# Patient Record
Sex: MALE | URBAN - NONMETROPOLITAN AREA
[De-identification: names, ages, dates, MRNs, and addresses within clinical notes are randomized per-mention and may not be internally consistent; named-entity substitution may affect disease eponyms.]

---

## 2024-09-30 ENCOUNTER — OFFICE VISIT (OUTPATIENT)
Dept: INTERNAL MEDICINE | Age: 29
End: 2024-09-30
Payer: MEDICAID

## 2024-09-30 ENCOUNTER — HOSPITAL ENCOUNTER (OUTPATIENT)
Dept: GENERAL RADIOLOGY | Age: 29
Discharge: HOME | End: 2024-09-30
Payer: MEDICAID

## 2024-09-30 VITALS
BODY MASS INDEX: 34.59 KG/M2 | HEIGHT: 72 IN | WEIGHT: 255.4 LBS | SYSTOLIC BLOOD PRESSURE: 120 MMHG | OXYGEN SATURATION: 97 % | DIASTOLIC BLOOD PRESSURE: 85 MMHG | HEART RATE: 75 BPM

## 2024-09-30 DIAGNOSIS — Z00.00 ANNUAL PHYSICAL EXAM: ICD-10-CM

## 2024-09-30 DIAGNOSIS — H83.3X9 NOISE-INDUCED HEARING LOSS, UNSPECIFIED LATERALITY: ICD-10-CM

## 2024-09-30 DIAGNOSIS — R07.89 LEFT-SIDED CHEST WALL PAIN: ICD-10-CM

## 2024-09-30 DIAGNOSIS — Z00.00 ANNUAL PHYSICAL EXAM: Primary | ICD-10-CM

## 2024-09-30 DIAGNOSIS — H16.139 ACTINIC KERATITIS, UNSPECIFIED LATERALITY: Primary | ICD-10-CM

## 2024-09-30 DIAGNOSIS — J30.1 NON-SEASONAL ALLERGIC RHINITIS DUE TO POLLEN: ICD-10-CM

## 2024-09-30 PROBLEM — J30.9 ALLERGIC RHINITIS: Status: ACTIVE | Noted: 2024-09-30

## 2024-09-30 LAB
ALBUMIN SERPL-MCNC: 4.6 G/DL (ref 3.5–5.2)
ALP SERPL-CCNC: 107 U/L (ref 40–129)
ALT SERPL-CCNC: 15 U/L (ref 5–41)
ANION GAP SERPL CALCULATED.3IONS-SCNC: 10 MMOL/L (ref 7–19)
AST SERPL-CCNC: 16 U/L (ref 5–40)
BASOPHILS # BLD: 0.1 K/UL (ref 0–0.2)
BASOPHILS NFR BLD: 0.9 % (ref 0–1)
BILIRUB SERPL-MCNC: 0.5 MG/DL (ref 0.2–1.2)
BUN SERPL-MCNC: 6 MG/DL (ref 6–20)
CALCIUM SERPL-MCNC: 8.9 MG/DL (ref 8.6–10)
CHLORIDE SERPL-SCNC: 101 MMOL/L (ref 98–111)
CHOLEST SERPL-MCNC: 186 MG/DL (ref 0–199)
CO2 SERPL-SCNC: 28 MMOL/L (ref 22–29)
CREAT SERPL-MCNC: 0.8 MG/DL (ref 0.7–1.2)
EOSINOPHIL # BLD: 0.5 K/UL (ref 0–0.6)
EOSINOPHIL NFR BLD: 5.3 % (ref 0–5)
ERYTHROCYTE [DISTWIDTH] IN BLOOD BY AUTOMATED COUNT: 13.5 % (ref 11.5–14.5)
GLUCOSE SERPL-MCNC: 87 MG/DL (ref 70–99)
HCT VFR BLD AUTO: 47.2 % (ref 42–52)
HDLC SERPL-MCNC: 42 MG/DL (ref 40–60)
HGB BLD-MCNC: 14.9 G/DL (ref 14–18)
IMM GRANULOCYTES # BLD: 0 K/UL
LDLC SERPL CALC-MCNC: 124 MG/DL
LYMPHOCYTES # BLD: 1.8 K/UL (ref 1.1–4.5)
LYMPHOCYTES NFR BLD: 21.1 % (ref 20–40)
MCH RBC QN AUTO: 29.4 PG (ref 27–31)
MCHC RBC AUTO-ENTMCNC: 31.6 G/DL (ref 33–37)
MCV RBC AUTO: 93.1 FL (ref 80–94)
MONOCYTES # BLD: 0.8 K/UL (ref 0–0.9)
MONOCYTES NFR BLD: 8.6 % (ref 0–10)
NEUTROPHILS # BLD: 5.6 K/UL (ref 1.5–7.5)
NEUTS SEG NFR BLD: 63.6 % (ref 50–65)
PLATELET # BLD AUTO: 289 K/UL (ref 130–400)
PMV BLD AUTO: 11.2 FL (ref 9.4–12.4)
POTASSIUM SERPL-SCNC: 4.3 MMOL/L (ref 3.5–5)
PROT SERPL-MCNC: 7.4 G/DL (ref 6.4–8.3)
RBC # BLD AUTO: 5.07 M/UL (ref 4.7–6.1)
SODIUM SERPL-SCNC: 139 MMOL/L (ref 136–145)
TRIGL SERPL-MCNC: 99 MG/DL (ref 0–149)
WBC # BLD AUTO: 8.7 K/UL (ref 4.8–10.8)

## 2024-09-30 PROCEDURE — 90715 TDAP VACCINE 7 YRS/> IM: CPT | Performed by: INTERNAL MEDICINE

## 2024-09-30 PROCEDURE — 71046 X-RAY EXAM CHEST 2 VIEWS: CPT

## 2024-09-30 PROCEDURE — 99204 OFFICE O/P NEW MOD 45 MIN: CPT | Performed by: INTERNAL MEDICINE

## 2024-09-30 PROCEDURE — 90471 IMMUNIZATION ADMIN: CPT | Performed by: INTERNAL MEDICINE

## 2024-09-30 SDOH — ECONOMIC STABILITY: FOOD INSECURITY: WITHIN THE PAST 12 MONTHS, THE FOOD YOU BOUGHT JUST DIDN'T LAST AND YOU DIDN'T HAVE MONEY TO GET MORE.: NEVER TRUE

## 2024-09-30 SDOH — ECONOMIC STABILITY: INCOME INSECURITY: HOW HARD IS IT FOR YOU TO PAY FOR THE VERY BASICS LIKE FOOD, HOUSING, MEDICAL CARE, AND HEATING?: NOT HARD AT ALL

## 2024-09-30 SDOH — ECONOMIC STABILITY: FOOD INSECURITY: WITHIN THE PAST 12 MONTHS, YOU WORRIED THAT YOUR FOOD WOULD RUN OUT BEFORE YOU GOT MONEY TO BUY MORE.: NEVER TRUE

## 2024-09-30 ASSESSMENT — ENCOUNTER SYMPTOMS
BLOOD IN STOOL: 0
WHEEZING: 0
SORE THROAT: 0
BACK PAIN: 0
SINUS PRESSURE: 0
NAUSEA: 0
ABDOMINAL PAIN: 0
SHORTNESS OF BREATH: 0
ABDOMINAL DISTENTION: 0
EYE DISCHARGE: 0
EYE ITCHING: 0
TROUBLE SWALLOWING: 0
VOMITING: 0

## 2024-09-30 ASSESSMENT — PATIENT HEALTH QUESTIONNAIRE - PHQ9
SUM OF ALL RESPONSES TO PHQ QUESTIONS 1-9: 0
SUM OF ALL RESPONSES TO PHQ9 QUESTIONS 1 & 2: 0
2. FEELING DOWN, DEPRESSED OR HOPELESS: NOT AT ALL
1. LITTLE INTEREST OR PLEASURE IN DOING THINGS: NOT AT ALL
SUM OF ALL RESPONSES TO PHQ QUESTIONS 1-9: 0

## 2024-09-30 NOTE — PROGRESS NOTES
[facility]  [facility] INTERNAL MEDICINE  [address] [place] [address] DRIVE  [address]  [place] [address]  Dept: [phone]  Dept Fax: [phone]  Loc: [phone]      Visit Date: 9/30/2024    Brant Calderon a 29 y.o. male who presents today for:  Chief Complaint   Patient presents with    New Patient     Patient was previously seeing Dr. Hawkins, (pt changed providers because of insurance changes and she was not in network) Establishing a New PCP, pt has no concerns         HPI:     Here to establish.  He is having left-sided anterior chest wall pain that he said for a year.  Happens daily does not seem to have any correlation with anything but last about 30 minutes at a time and goes away till the next day.  He is getting ready go to [place] in the winter and he needs some back immunizations as well    Past Medical History:   Diagnosis Date    Depression       No past surgical history on file.    Family History   Problem Relation Age of Onset    Diabetes type 2  Mother     Diabetes type 2  Father     Dementia Maternal Grandmother     Parkinson's Disease Maternal Grandmother     Dementia Paternal Grandmother     Parkinson's Disease Paternal Grandmother        Social History     Tobacco Use    Smoking status: Former     Types: Cigarettes    Smokeless tobacco: Never   Substance Use Topics    Alcohol use: Yes      No current outpatient medications on file.     No current facility-administered medications for this visit.     No Known Allergies      Subjective:     Review of Systems   Constitutional:  Negative for activity change, appetite change, fatigue and fever.   HENT:  Positive for hearing loss. Negative for congestion, sinus pressure, sore throat and trouble swallowing.    Eyes:  Negative for discharge and itching.   Respiratory:  Negative for shortness of breath and wheezing.         Left anterior chest wall pain   Cardiovascular:  Negative for chest pain, palpitations and leg swelling.

## 2024-10-01 ENCOUNTER — OFFICE VISIT (OUTPATIENT)
Dept: ENT CLINIC | Age: 29
End: 2024-10-01
Payer: MEDICAID

## 2024-10-01 VITALS
WEIGHT: 256 LBS | BODY MASS INDEX: 34.67 KG/M2 | DIASTOLIC BLOOD PRESSURE: 84 MMHG | HEIGHT: 72 IN | SYSTOLIC BLOOD PRESSURE: 122 MMHG

## 2024-10-01 DIAGNOSIS — H91.8X3 ASYMMETRICAL HEARING LOSS: Primary | ICD-10-CM

## 2024-10-01 DIAGNOSIS — H61.23 BILATERAL IMPACTED CERUMEN: ICD-10-CM

## 2024-10-01 PROCEDURE — 69210 REMOVE IMPACTED EAR WAX UNI: CPT | Performed by: PHYSICIAN ASSISTANT

## 2024-10-01 PROCEDURE — 99203 OFFICE O/P NEW LOW 30 MIN: CPT | Performed by: PHYSICIAN ASSISTANT

## 2024-10-01 ASSESSMENT — ENCOUNTER SYMPTOMS
TROUBLE SWALLOWING: 0
PHOTOPHOBIA: 0
RHINORRHEA: 0
VOICE CHANGE: 0
SORE THROAT: 0
EYE PAIN: 0
SINUS PAIN: 0
SINUS PRESSURE: 0
FACIAL SWELLING: 0

## 2024-10-01 NOTE — PROGRESS NOTES
[facility] OTOLARYNGOLOGY/ENT  Brant is a pleasant 29-year-old  male that was referred by Dr. Phani Cabrera due to problems with hearing loss and the inability to understand speech.  He reports that for about a year he has noticed decreased hearing specifically to the left ear with bilateral nonpulsatile tinnitus that has been getting louder.  Denies any issues with dizziness or any vision changes.  He reports he has had multiple myringotomy tubes in the past as a child.  Overall he reports that he has to lip read due to the voice being muffled and somewhat hard to understand.        Allergies: Patient has no known allergies.      No current outpatient medications on file.     No current facility-administered medications for this visit.       No past surgical history on file.    Past Medical History:   Diagnosis Date    Depression        Family History   Problem Relation Age of Onset    Diabetes type 2  Mother     Diabetes type 2  Father     Dementia Maternal Grandmother     Parkinson's Disease Maternal Grandmother     Dementia Paternal Grandmother     Parkinson's Disease Paternal Grandmother        Social History     Tobacco Use    Smoking status: Former     Types: Cigarettes    Smokeless tobacco: Never   Substance Use Topics    Alcohol use: Yes           REVIEW OF SYSTEMS:  all other systems reviewed and are negative  Review of Systems   Constitutional:  Negative for chills and fever.   HENT:  Negative for congestion, dental problem, ear discharge, ear pain, facial swelling, hearing loss, postnasal drip, rhinorrhea, sinus pressure, sinus pain, sore throat, tinnitus, trouble swallowing and voice change.    Eyes:  Negative for photophobia and pain.   Neurological:  Negative for dizziness and headaches.           Comments:     PHYSICAL EXAM:    /84   Ht 1.829 m (6' 0.01\")   Wt 116.1 kg (256 lb)   BMI 34.71 kg/m²   Body mass index is 34.71 kg/m².    General Appearance: well developed  and well

## 2024-10-01 NOTE — ASSESSMENT & PLAN NOTE
Asymmetrical hearing loss of the left ear suspicious for sensorineural hearing loss  Plan: I recommended referring him to audiology for a hearing screening.  I will call him the results once this has been completed.  If the hearing test is unremarkable, we will discuss further detail about the retained myringotomy tube to the right ear.

## 2024-10-04 ENCOUNTER — HOSPITAL ENCOUNTER (OUTPATIENT)
Dept: ULTRASOUND IMAGING | Age: 29
Discharge: HOME | End: 2024-10-04
Payer: MEDICAID

## 2024-10-04 DIAGNOSIS — J30.1 NON-SEASONAL ALLERGIC RHINITIS DUE TO POLLEN: ICD-10-CM

## 2024-10-04 DIAGNOSIS — Z00.00 ANNUAL PHYSICAL EXAM: ICD-10-CM

## 2024-10-04 DIAGNOSIS — H83.3X9 NOISE-INDUCED HEARING LOSS, UNSPECIFIED LATERALITY: ICD-10-CM

## 2024-10-04 DIAGNOSIS — R07.89 LEFT-SIDED CHEST WALL PAIN: ICD-10-CM

## 2024-10-04 PROCEDURE — 76700 US EXAM ABDOM COMPLETE: CPT

## 2024-10-21 DIAGNOSIS — H61.23 BILATERAL IMPACTED CERUMEN: ICD-10-CM

## 2024-10-21 DIAGNOSIS — L57.0 ACTINIC KERATOSIS: ICD-10-CM

## 2024-10-21 DIAGNOSIS — H91.8X3 ASYMMETRICAL HEARING LOSS: Primary | ICD-10-CM

## 2024-10-21 DIAGNOSIS — R16.1 SPLEEN ENLARGED: ICD-10-CM

## 2024-10-21 DIAGNOSIS — J30.1 NON-SEASONAL ALLERGIC RHINITIS DUE TO POLLEN: ICD-10-CM

## 2024-10-31 ENCOUNTER — TELEPHONE (OUTPATIENT)
Dept: ENT CLINIC | Age: 29
End: 2024-10-31

## 2024-10-31 NOTE — TELEPHONE ENCOUNTER
I called the results of the hearing screening to the patient.  Patient was noted with normal hearing with no deficit.  Recommended Lipoflavonoid and background noise machine for the tinnitus.  Would also recommend hearing amplifiers if difficulty continues to be an issue.  He was also recommended yearly hearing test.   He is to follow-up with me as needed.      Electronically signed by HASEEB BARRIOS PA-C on 10/31/24 at 4:30 PM ANNAMARIET

## 2024-12-31 ENCOUNTER — TELEPHONE (OUTPATIENT)
Dept: HEMATOLOGY | Age: 29
End: 2024-12-31

## 2024-12-31 NOTE — TELEPHONE ENCOUNTER
I called patient and left detailed voicemail about their appointment on 01/03/2025. I made patient aware not to arrive any earlier than the appointment time and to come at the time of the follow up not the time of the lab appointment if it is different than the follow up appt time. I also made patient aware to eat and drink plenty of water to hydrate properly before coming to these appointments because this will make their lab draw much easier. Made patient aware that we are now located at the [facility] at [address] [place] [address]. Located between [facility] and the [facility].

## 2025-01-02 DIAGNOSIS — R16.1 SPLENOMEGALY: Primary | ICD-10-CM

## 2025-01-02 NOTE — PROGRESS NOTES
would suggest chronic lymphocytic leukemia (CLL). His red blood cell count is within normal limits, and his platelet count is marginally elevated. The elevated white blood cell and platelet counts could be attributed to his allergies. The pain he experiences could be musculoskeletal in nature rather than spleen-related,, do not expect pain from minimally enlarged spleen-only 14 cm.  His body habitus and fatty liver could also contribute to his slightly enlarged spleen. Initial blood work will be conducted today. If the results are negative, additional blood work will be ordered during his next visit to rule out other conditions. A FibroSure test will also be performed today to assess for fibrosis and fatty liver. If the initial blood work is negative, further testing for myeloproliferative disorders will be considered. If the FibroSure test does not yield any significant findings, a hepatic elastography may be considered to evaluate for potential liver scarring.    If initial serology workup for lymphoproliferative disorder is negative then evaluation for myeloproliferative disorder with BCR-ABL, JAK2 will be drawn at the next visit.    He will be leaving in about a week to go to [place] for a family wedding.  He will be back the first part of February.      The patient (or guardian, if applicable) and other individuals in attendance with the patient were advised that Artificial Intelligence will be utilized during this visit to record, process the conversation to generate a clinical note, and support improvement of the AI technology. The patient (or guardian, if applicable) and other individuals in attendance at the appointment consented to the use of AI, including the recording.        Thank you Dr. Cabrera for referring Mr. Lee for evaluation      Orders this visit  CBC with differential  Peripheral blood flow cytometry to [facility]  Monoclonal protein serology  LDH  B2M  EBV  FibroSure             Return in

## 2025-01-03 ENCOUNTER — HOSPITAL ENCOUNTER (OUTPATIENT)
Dept: INFUSION THERAPY | Age: 30
Discharge: HOME | End: 2025-01-03
Payer: MEDICAID

## 2025-01-03 ENCOUNTER — OFFICE VISIT (OUTPATIENT)
Dept: HEMATOLOGY | Age: 30
End: 2025-01-03
Payer: MEDICAID

## 2025-01-03 VITALS
DIASTOLIC BLOOD PRESSURE: 82 MMHG | SYSTOLIC BLOOD PRESSURE: 132 MMHG | HEIGHT: 72 IN | TEMPERATURE: 98.3 F | WEIGHT: 258.9 LBS | HEART RATE: 77 BPM | OXYGEN SATURATION: 98 % | BODY MASS INDEX: 35.07 KG/M2

## 2025-01-03 DIAGNOSIS — R16.1 SPLENOMEGALY: Primary | ICD-10-CM

## 2025-01-03 DIAGNOSIS — K76.0 HEPATIC STEATOSIS: ICD-10-CM

## 2025-01-03 DIAGNOSIS — R16.1 SPLENOMEGALY: ICD-10-CM

## 2025-01-03 LAB
BASOPHILS # BLD: 0.07 K/UL (ref 0.01–0.08)
BASOPHILS NFR BLD: 0.7 % (ref 0.1–1.2)
EOSINOPHIL # BLD: 0.39 K/UL (ref 0.04–0.54)
EOSINOPHIL NFR BLD: 3.7 % (ref 0.7–7)
ERYTHROCYTE [DISTWIDTH] IN BLOOD BY AUTOMATED COUNT: 13.3 % (ref 11.6–14.4)
HCT VFR BLD AUTO: 46.7 % (ref 40.1–51)
HGB BLD-MCNC: 15.5 G/DL (ref 13.7–17.5)
LDH SERPL-CCNC: 170 U/L (ref 135–225)
LYMPHOCYTES # BLD: 2.01 K/UL (ref 1.18–3.74)
LYMPHOCYTES NFR BLD: 18.8 % (ref 19.3–53.1)
MCH RBC QN AUTO: 28.5 PG (ref 25.7–32.2)
MCHC RBC AUTO-ENTMCNC: 33.2 G/DL (ref 32.3–36.5)
MCV RBC AUTO: 86 FL (ref 79–92.2)
MONOCYTES # BLD: 0.88 K/UL (ref 0.24–0.82)
MONOCYTES NFR BLD: 8.2 % (ref 4.7–12.5)
NEUTROPHILS # BLD: 7.29 K/UL (ref 1.56–6.13)
NEUTS SEG NFR BLD: 68.2 % (ref 34–71.1)
PLATELET # BLD AUTO: 340 K/UL (ref 163–337)
PMV BLD AUTO: 10.5 FL (ref 7.4–10.4)
RBC # BLD AUTO: 5.43 M/UL (ref 4.63–6.08)
WBC # BLD AUTO: 10.68 K/UL (ref 4.23–9.07)

## 2025-01-03 PROCEDURE — 99214 OFFICE O/P EST MOD 30 MIN: CPT | Performed by: PHYSICIAN ASSISTANT

## 2025-01-03 PROCEDURE — 85025 COMPLETE CBC W/AUTO DIFF WBC: CPT

## 2025-01-03 PROCEDURE — 36415 COLL VENOUS BLD VENIPUNCTURE: CPT

## 2025-01-03 PROCEDURE — 83615 LACTATE (LD) (LDH) ENZYME: CPT

## 2025-01-03 PROCEDURE — 36415 COLL VENOUS BLD VENIPUNCTURE: CPT | Performed by: PHYSICIAN ASSISTANT

## 2025-01-03 RX ORDER — FERROUS SULFATE 325(65) MG
325 TABLET ORAL
COMMUNITY

## 2025-01-03 RX ORDER — VITAMIN B COMPLEX
1 CAPSULE ORAL DAILY
COMMUNITY

## 2025-01-03 RX ORDER — FLUTICASONE PROPIONATE 50 MCG
1 SPRAY, SUSPENSION (ML) NASAL DAILY PRN
COMMUNITY

## 2025-01-03 RX ORDER — AZELASTINE 1 MG/ML
1 SPRAY, METERED NASAL 2 TIMES DAILY
COMMUNITY

## 2025-01-03 ASSESSMENT — ENCOUNTER SYMPTOMS
VOMITING: 0
VOICE CHANGE: 0
DIARRHEA: 0
WHEEZING: 0
SHORTNESS OF BREATH: 0
EYE ITCHING: 0
CONSTIPATION: 0
TROUBLE SWALLOWING: 0
BLOOD IN STOOL: 0
EYE DISCHARGE: 0
NAUSEA: 0
ABDOMINAL PAIN: 1
ABDOMINAL DISTENTION: 0
BACK PAIN: 0
COUGH: 0
COLOR CHANGE: 0
PHOTOPHOBIA: 0
SORE THROAT: 0

## 2025-01-06 LAB
ALBUMIN SERPL-MCNC: 4.75 G/DL (ref 3.75–5.01)
ALPHA1 GLOB SERPL ELPH-MCNC: 0.37 G/DL (ref 0.19–0.46)
ALPHA2 GLOB SERPL ELPH-MCNC: 0.71 G/DL (ref 0.48–1.05)
B-GLOBULIN SERPL ELPH-MCNC: 0.83 G/DL (ref 0.48–1.1)
DEPRECATED KAPPA LC FREE/LAMBDA SER: 1.29 {RATIO} (ref 0.26–1.65)
EBV EA-D IGG SER-ACNC: <5 U/ML (ref 0–10.9)
EBV NA IGG SER IA-ACNC: >600 U/ML (ref 0–21.9)
EBV VCA IGG SER IA-ACNC: 68.7 U/ML (ref 0–21.9)
EBV VCA IGM SER IA-ACNC: <10 U/ML (ref 0–43.9)
EER MONOCLONAL PROTEIN AND FLC, SERUM: NORMAL
GAMMA GLOB SERPL ELPH-MCNC: 1.04 G/DL (ref 0.62–1.51)
IGA SERPL-MCNC: 98 MG/DL (ref 68–408)
IGG SERPL-MCNC: 1109 MG/DL (ref 768–1632)
IGM SERPL-MCNC: 39 MG/DL (ref 35–263)
INTERPRETATION SERPL IFE-IMP: NORMAL
INTERPRETATION SERPL IFE-IMP: NORMAL
KAPPA LC FREE SER-MCNC: 18.83 MG/L (ref 3.3–19.4)
LAMBDA LC FREE SERPL-MCNC: 14.61 MG/L (ref 5.71–26.3)
MONOCLONAL PROTEIN, SERUM: NORMAL G/DL
PROT SERPL-MCNC: 7.7 G/DL (ref 6.3–8.2)

## 2025-01-07 LAB
A2 MACROGLOB SERPL-MCNC: 196 MG/DL (ref 110–276)
ALT SERPL W P-5'-P-CCNC: 26 IU/L (ref 0–55)
APO A-I SERPL-MCNC: 144 MG/DL (ref 101–178)
BILIRUB SERPL-MCNC: 0.6 MG/DL (ref 0–1.2)
COMMENT: NORMAL
FIBROSIS SCORE: NORMAL
FIBROSIS STAGE: NORMAL
GGT SERPL-CCNC: 22 IU/L (ref 0–65)
HAPTOGLOB SERPL-MCNC: 190 MG/DL (ref 17–317)
INTERPRETATIONS:: NORMAL
LIMITATIONS:: NORMAL
NECROINFLAMM ACTIVITY SCORING:: NORMAL
NECROINFLAMMAT ACTIVITY GRADE: NORMAL
NECROINFLAMMAT ACTIVITY SCORE: 0.09 (ref 0–0.17)

## 2025-02-12 ENCOUNTER — TELEPHONE (OUTPATIENT)
Dept: HEMATOLOGY | Age: 30
End: 2025-02-12

## 2025-02-12 NOTE — TELEPHONE ENCOUNTER
Called pt. to remind them of appointment on 02/17/2025 and had to leave a detailed voicemail with appointment date and time. Reminded patient to just come at appointment time, and to not come at the lab appointment time. Reminded patient that we will not check them in any more than 30 minutes before appointment time. We have now moved to the [facility] that is located between our old office and the ER at the [facility]. Letting the Pt know that our front entrance faces the [employer] fields and leaving our address. Reminded pt to eat well and be well hydrated for their labs.

## 2025-02-15 DIAGNOSIS — R16.1 SPLENOMEGALY: Primary | ICD-10-CM

## 2025-02-15 DIAGNOSIS — K76.0 HEPATIC STEATOSIS: ICD-10-CM

## 2025-02-15 NOTE — PROGRESS NOTES
General: He is not in acute distress.     Appearance: He is well-developed and overweight.   HENT:      Head: Normocephalic and atraumatic.      Nose: Nose normal.   Eyes:      General: No scleral icterus.     Conjunctiva/sclera: Conjunctivae normal.   Neck:      Vascular: No JVD.      Trachea: No tracheal deviation.   Cardiovascular:      Rate and Rhythm: Normal rate and regular rhythm.      Heart sounds: Normal heart sounds. No murmur heard.  Pulmonary:      Effort: Pulmonary effort is normal. No respiratory distress.      Breath sounds: Normal breath sounds. No wheezing.   Abdominal:      General: Bowel sounds are normal. There is no distension.      Palpations: Abdomen is soft. There is no fluid wave, hepatomegaly, splenomegaly or mass.      Tenderness: There is abdominal tenderness in the left upper quadrant.   Musculoskeletal:         General: Tenderness (Lateral left lower ribs) present. No deformity.   Lymphadenopathy:      Cervical: No cervical adenopathy.      Upper Body:      Right upper body: No supraclavicular or axillary adenopathy.      Left upper body: No supraclavicular or axillary adenopathy.      Lower Body: No right inguinal adenopathy. No left inguinal adenopathy.   Skin:     Findings: No erythema or rash.   Neurological:      Mental Status: He is alert and oriented to person, place, and time.   Psychiatric:         Thought Content: Thought content normal.       CBC reviewed by me  Lab Results   Component Value Date    WBC 10.06 02/17/2025    HGB 14.6 02/17/2025    HCT 44.5 02/17/2025    MCV 87.3 02/17/2025     02/17/2025     Lab Results   Component Value Date    NEUTROABS 6.35 02/17/2025       VISIT DIAGNOSES  1. Splenomegaly    2. Hepatic steatosis        ASSESSMENT/PLAN:        1. Splenomegaly -not at goal  2.  Left upper quadrant pain    US Abdomen complete on 10/21/2024 at LMP . Comparison None  Hyper echogenic liver parenchyma most commonly due to steatosis.  Chronic and sinus

## 2025-02-17 ENCOUNTER — HOSPITAL ENCOUNTER (OUTPATIENT)
Dept: INFUSION THERAPY | Age: 30
Discharge: HOME | End: 2025-02-17
Payer: MEDICAID

## 2025-02-17 ENCOUNTER — OFFICE VISIT (OUTPATIENT)
Dept: HEMATOLOGY | Age: 30
End: 2025-02-17
Payer: MEDICAID

## 2025-02-17 VITALS
TEMPERATURE: 97.7 F | OXYGEN SATURATION: 98 % | HEART RATE: 77 BPM | DIASTOLIC BLOOD PRESSURE: 70 MMHG | BODY MASS INDEX: 35.62 KG/M2 | WEIGHT: 263 LBS | SYSTOLIC BLOOD PRESSURE: 112 MMHG | HEIGHT: 72 IN

## 2025-02-17 DIAGNOSIS — R16.1 SPLENOMEGALY: ICD-10-CM

## 2025-02-17 DIAGNOSIS — R16.1 SPLENOMEGALY: Primary | ICD-10-CM

## 2025-02-17 DIAGNOSIS — K76.0 HEPATIC STEATOSIS: ICD-10-CM

## 2025-02-17 DIAGNOSIS — R10.12 LEFT UPPER QUADRANT PAIN: ICD-10-CM

## 2025-02-17 LAB
BASOPHILS # BLD: 0.07 K/UL (ref 0–0.2)
BASOPHILS NFR BLD: 0.7 % (ref 0–1)
EOSINOPHIL # BLD: 0.4 K/UL (ref 0–0.6)
EOSINOPHIL NFR BLD: 4 % (ref 0–5)
ERYTHROCYTE [DISTWIDTH] IN BLOOD BY AUTOMATED COUNT: 13.2 % (ref 11.5–14.5)
HCT VFR BLD AUTO: 44.5 % (ref 42–52)
HGB BLD-MCNC: 14.6 G/DL (ref 14–18)
LYMPHOCYTES # BLD: 2.31 K/UL (ref 1.1–4.5)
LYMPHOCYTES NFR BLD: 23 % (ref 20–40)
MCH RBC QN AUTO: 28.6 PG (ref 27–31)
MCHC RBC AUTO-ENTMCNC: 32.8 G/DL (ref 33–37)
MCV RBC AUTO: 87.3 FL (ref 80–94)
MONOCYTES # BLD: 0.89 K/UL (ref 0–0.9)
MONOCYTES NFR BLD: 8.8 % (ref 1–10)
NEUTROPHILS # BLD: 6.35 K/UL (ref 1.5–7.5)
NEUTS SEG NFR BLD: 63.1 % (ref 50–65)
PLATELET # BLD AUTO: 275 K/UL (ref 130–400)
PMV BLD AUTO: 10.9 FL (ref 9.4–12.4)
RBC # BLD AUTO: 5.1 M/UL (ref 4.7–6.1)
WBC # BLD AUTO: 10.06 K/UL (ref 4.8–10.8)

## 2025-02-17 PROCEDURE — 36415 COLL VENOUS BLD VENIPUNCTURE: CPT

## 2025-02-17 PROCEDURE — 81219 CALR GENE COM VARIANTS: CPT

## 2025-02-17 PROCEDURE — 81338 MPL GENE COMMON VARIANTS: CPT

## 2025-02-17 PROCEDURE — 99213 OFFICE O/P EST LOW 20 MIN: CPT

## 2025-02-17 PROCEDURE — 81279 JAK2 GENE TRGT SEQUENCE ALYS: CPT

## 2025-02-17 PROCEDURE — 85025 COMPLETE CBC W/AUTO DIFF WBC: CPT

## 2025-02-17 PROCEDURE — 99213 OFFICE O/P EST LOW 20 MIN: CPT | Performed by: PHYSICIAN ASSISTANT

## 2025-02-17 PROCEDURE — 81270 JAK2 GENE: CPT

## 2025-02-17 ASSESSMENT — ENCOUNTER SYMPTOMS
WHEEZING: 0
SHORTNESS OF BREATH: 0
VOMITING: 0
BACK PAIN: 0
VOICE CHANGE: 0
TROUBLE SWALLOWING: 0
CONSTIPATION: 0
PHOTOPHOBIA: 0
BLOOD IN STOOL: 0
COUGH: 0
DIARRHEA: 0
ABDOMINAL PAIN: 1
COLOR CHANGE: 0
ABDOMINAL DISTENTION: 0
NAUSEA: 0
EYE DISCHARGE: 0
EYE ITCHING: 0
SORE THROAT: 0

## 2025-02-20 ENCOUNTER — HOSPITAL ENCOUNTER (OUTPATIENT)
Dept: CT IMAGING | Age: 30
Discharge: HOME | End: 2025-02-20
Payer: MEDICAID

## 2025-02-20 DIAGNOSIS — R16.1 SPLENOMEGALY: ICD-10-CM

## 2025-02-20 PROCEDURE — 6360000004 HC RX CONTRAST MEDICATION: Performed by: PHYSICIAN ASSISTANT

## 2025-02-20 PROCEDURE — 74170 CT ABD WO CNTRST FLWD CNTRST: CPT

## 2025-02-20 RX ORDER — IOPAMIDOL 755 MG/ML
75 INJECTION, SOLUTION INTRAVASCULAR
Status: COMPLETED | OUTPATIENT
Start: 2025-02-20 | End: 2025-02-20

## 2025-02-20 RX ADMIN — IOPAMIDOL 75 ML: 755 INJECTION, SOLUTION INTRAVENOUS at 15:01

## 2025-02-24 LAB
CALR EXON 9 MUT ANL BLD/T: NORMAL
CITATION REF LAB TEST: NORMAL
JAK2 GENE MUT ANL BLD/T: NORMAL
JAK2 P.V617F BLD/T QL: NORMAL
LAB DIRECTOR NAME PROVIDER: NORMAL
MPL GENE MUT TESTED MAR: NORMAL
REF LAB TEST METHOD: NORMAL
REFLEX: NORMAL
TEST PERFORMANCE INFO SPEC: NORMAL

## 2025-03-11 ENCOUNTER — TELEPHONE (OUTPATIENT)
Dept: HEMATOLOGY | Age: 30
End: 2025-03-11

## 2025-03-11 NOTE — TELEPHONE ENCOUNTER
Called Patient and reminded patient of their appointment on 03/14/2025 and patient confirmed they would be here. Reminded patient to just come at appointment time, and to not come at the lab appointment time. Reminded patient that we will not check them in any more than 30 minutes before appointment time.  We have now moved to the [facility] [place] that is located between our old office and the ER at the [facility]. Letting the Pt know that our front entrance faces the  [employer] [place]. Reminded pt to eat well and be well hydrated for their labs.

## 2025-03-13 DIAGNOSIS — R16.1 SPLENOMEGALY: Primary | ICD-10-CM

## 2025-03-13 NOTE — PROGRESS NOTES
Progress Note      Pt Name: Brant Lee  YOB: 1995  MRN: [id number]    Date of evaluation: 3/14/2025  History Obtained From:  patient, electronic medical record    CHIEF COMPLAINT:    Chief Complaint   Patient presents with    Follow-up     Splenomegaly       Current active problems  Mild - mod splenomegaly    History of Present Illness  Brant Lee is a 30 y.o.  male seen in the office initially on 1/3/2025 due to splenomegaly.      He continues to experience intermittent abdominal discomfort. He has not been consuming raw fish or meat recently but acknowledges past consumption. The onset of his symptoms coincided with his residence in [facility]. He reports no hematuria, with his urine typically being clear. He expresses concern about potential radiation exposure from a repeat CT scan. 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 He questions whether there could be a -specific illness contributing to his current condition. He reports no recent weight loss, symptoms of encephalitis, or neurological issues. He has been managing his abdominal discomfort through stretching exercises and has not required any analgesics. He reports no gastrointestinal disturbances following the consumption of greasy or spicy foods. He has not attempted to manage his symptoms with [employer]. He has not been evaluated for sleep apnea and is uncertain about the presence of snoring or sleep disturbances. He has a scheduled appointment with Dr. Cabrera in 10/2025.    He also reports experiencing fatigue, which he describes as progressively worsening. He recounts an incident where he engaged in a canal loop hike on Sunday, after which he required rest for the remainder of the day and was unable to perform any other activities. He does not attribute this to deconditioning, as he is

## 2025-03-14 ENCOUNTER — HOSPITAL ENCOUNTER (OUTPATIENT)
Dept: INFUSION THERAPY | Age: 30
Discharge: HOME | End: 2025-03-14
Payer: MEDICAID

## 2025-03-14 ENCOUNTER — OFFICE VISIT (OUTPATIENT)
Dept: HEMATOLOGY | Age: 30
End: 2025-03-14
Payer: MEDICAID

## 2025-03-14 VITALS
HEIGHT: 72 IN | DIASTOLIC BLOOD PRESSURE: 88 MMHG | TEMPERATURE: 97.2 F | HEART RATE: 82 BPM | WEIGHT: 259.9 LBS | OXYGEN SATURATION: 99 % | SYSTOLIC BLOOD PRESSURE: 130 MMHG | BODY MASS INDEX: 35.2 KG/M2

## 2025-03-14 DIAGNOSIS — R53.83 OTHER FATIGUE: ICD-10-CM

## 2025-03-14 DIAGNOSIS — R16.1 SPLENOMEGALY: Primary | ICD-10-CM

## 2025-03-14 DIAGNOSIS — R10.12 LEFT UPPER QUADRANT PAIN: ICD-10-CM

## 2025-03-14 DIAGNOSIS — R16.1 SPLENOMEGALY: ICD-10-CM

## 2025-03-14 LAB
BASOPHILS # BLD: 0.07 K/UL (ref 0–0.2)
BASOPHILS NFR BLD: 0.6 % (ref 0–1)
EOSINOPHIL # BLD: 0.5 K/UL (ref 0–0.6)
EOSINOPHIL NFR BLD: 4.3 % (ref 0–5)
ERYTHROCYTE [DISTWIDTH] IN BLOOD BY AUTOMATED COUNT: 13.2 % (ref 11.5–14.5)
HAV IGM SERPL QL IA: NONREACTIVE
HBV CORE IGM SERPL QL IA: NONREACTIVE
HBV SURFACE AG SERPL QL IA: NORMAL
HCT VFR BLD AUTO: 46.4 % (ref 42–52)
HCV AB SERPL QL IA: NORMAL
HGB BLD-MCNC: 15.1 G/DL (ref 14–18)
HIV-1 P24 AG: NORMAL
HIV1+2 AB SERPLBLD QL IA.RAPID: NORMAL
LYMPHOCYTES # BLD: 2.16 K/UL (ref 1.1–4.5)
LYMPHOCYTES NFR BLD: 18.4 % (ref 20–40)
MCH RBC QN AUTO: 28.7 PG (ref 27–31)
MCHC RBC AUTO-ENTMCNC: 32.5 G/DL (ref 33–37)
MCV RBC AUTO: 88.2 FL (ref 80–94)
MONOCYTES # BLD: 0.9 K/UL (ref 0–0.9)
MONOCYTES NFR BLD: 7.7 % (ref 1–10)
NEUTROPHILS # BLD: 8.08 K/UL (ref 1.5–7.5)
NEUTS SEG NFR BLD: 68.6 % (ref 50–65)
PLATELET # BLD AUTO: 270 K/UL (ref 130–400)
PMV BLD AUTO: 11 FL (ref 9.4–12.4)
RBC # BLD AUTO: 5.26 M/UL (ref 4.7–6.1)
WBC # BLD AUTO: 11.76 K/UL (ref 4.8–10.8)

## 2025-03-14 PROCEDURE — 85025 COMPLETE CBC W/AUTO DIFF WBC: CPT

## 2025-03-14 PROCEDURE — 36415 COLL VENOUS BLD VENIPUNCTURE: CPT

## 2025-03-14 PROCEDURE — 86645 CMV ANTIBODY IGM: CPT

## 2025-03-14 PROCEDURE — 86644 CMV ANTIBODY: CPT

## 2025-03-14 PROCEDURE — 99213 OFFICE O/P EST LOW 20 MIN: CPT

## 2025-03-14 PROCEDURE — 82232 ASSAY OF BETA-2 PROTEIN: CPT

## 2025-03-14 PROCEDURE — 80074 ACUTE HEPATITIS PANEL: CPT

## 2025-03-14 PROCEDURE — 87806 HIV AG W/HIV1&2 ANTB W/OPTIC: CPT

## 2025-03-14 PROCEDURE — 99214 OFFICE O/P EST MOD 30 MIN: CPT | Performed by: PHYSICIAN ASSISTANT

## 2025-03-14 ASSESSMENT — ENCOUNTER SYMPTOMS
EYE ITCHING: 0
WHEEZING: 0
CONSTIPATION: 0
BACK PAIN: 0
EYE DISCHARGE: 0
COLOR CHANGE: 0
ABDOMINAL PAIN: 1
PHOTOPHOBIA: 0
VOMITING: 0
VOICE CHANGE: 0
BLOOD IN STOOL: 0
SHORTNESS OF BREATH: 0
ABDOMINAL DISTENTION: 0
DIARRHEA: 0
COUGH: 0
TROUBLE SWALLOWING: 0
NAUSEA: 0
SORE THROAT: 0

## 2025-03-16 LAB
B2 MICROGLOB SERPL-MCNC: 2.6 MG/L (ref 0.8–2.4)
CMV IGG SERPL IA-ACNC: 5.3 U/ML
CMV IGM SERPL IA-ACNC: <8 AU/ML

## 2025-03-17 ENCOUNTER — RESULTS FOLLOW-UP (OUTPATIENT)
Dept: INFUSION THERAPY | Age: 30
End: 2025-03-17

## 2025-06-10 ENCOUNTER — TELEPHONE (OUTPATIENT)
Dept: HEMATOLOGY | Age: 30
End: 2025-06-10

## 2025-06-10 NOTE — TELEPHONE ENCOUNTER
I called patient and left detailed voicemail about their appointment on 06/16/2025. I made patient aware not to arrive any earlier than the appointment time and to come at the time of the follow up not the time of the lab appointment if it is different than the follow up appt time. I also made patient aware to eat and drink plenty of water to hydrate properly before coming to these appointments because this will make their lab draw much easier. Made patient aware that we are now located at the [facility] at [address] [place] [address]. Located between [facility] and the [facility].

## 2025-06-13 DIAGNOSIS — R10.12 LEFT UPPER QUADRANT PAIN: ICD-10-CM

## 2025-06-13 DIAGNOSIS — R16.1 SPLENOMEGALY: Primary | ICD-10-CM

## 2025-06-13 DIAGNOSIS — R53.83 OTHER FATIGUE: ICD-10-CM

## 2025-06-13 DIAGNOSIS — K76.0 HEPATIC STEATOSIS: ICD-10-CM

## 2025-06-13 NOTE — PROGRESS NOTES
Progress Note      Pt Name: Brant Lee  YOB: 1995  MRN: [id number]    Date of evaluation: 6/16/2025  History Obtained From:  patient, electronic medical record    CHIEF COMPLAINT:    Chief Complaint   Patient presents with    Follow-up     Splenomegaly       Current active problems  Mild - mod splenomegaly  Cytomegalovirus    History of Present Illness  Brant Lee is a 30 y.o.  male seen in the office initially on 1/3/2025 due to splenomegaly.      Workup has been unrevealing for a myeloid or lymphoid etiology.  He did have a positive CMV IgG denoting a past infection.    He reports a persistent level of pain, which remains unchanged. His fatigue has escalated to the point where he feels overwhelmed upon returning home from work, necessitating immediate sleep. He does not experience any episodes of snoring or awakenings at night. He also does not report any instances of daytime sleepiness or excessive tiredness.    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  It went to a wedding in [facility] between his initial visit, had a good time.    HEMATOLOGY HISTORY:  Brant was seen in initial hematology/medical oncology consultation on 1/3/2025 referred from Dr. Adan Cabrera for evaluation of splenomegaly.      US Abdomen complete on 10/21/2024 at LMP . Comparison None  Hyper echogenic liver parenchyma most commonly due to steatosis.  Chronic and sinus and/or fibrosis is also possible.  Mild splenomegaly. (14.0 x 13.3 x 5.1 cm, estimated volume is 494 m ).    Brant reported a significant weight loss over the past few months, which he attributes to recent dietary changes aimed at reducing fat intake due to a diagnosis of fatty liver. He experienced night sweats, particularly in warm environments, and has been dealing with persistent pruritus. He also reports severe fatigue, often feeling the need to return to bed shortly after waking, a symptom that has been present

## 2025-06-16 ENCOUNTER — HOSPITAL ENCOUNTER (OUTPATIENT)
Dept: INFUSION THERAPY | Age: 30
Discharge: HOME | End: 2025-06-16
Payer: MEDICAID

## 2025-06-16 ENCOUNTER — OFFICE VISIT (OUTPATIENT)
Dept: HEMATOLOGY | Age: 30
End: 2025-06-16
Payer: MEDICAID

## 2025-06-16 VITALS
OXYGEN SATURATION: 97 % | SYSTOLIC BLOOD PRESSURE: 116 MMHG | HEIGHT: 72 IN | HEART RATE: 73 BPM | BODY MASS INDEX: 34.52 KG/M2 | TEMPERATURE: 97.7 F | DIASTOLIC BLOOD PRESSURE: 82 MMHG | WEIGHT: 254.9 LBS

## 2025-06-16 DIAGNOSIS — B25.9 CYTOMEGALOVIRUS INFECTION, UNSPECIFIED CYTOMEGALOVIRAL INFECTION TYPE (HCC): ICD-10-CM

## 2025-06-16 DIAGNOSIS — R53.83 OTHER FATIGUE: ICD-10-CM

## 2025-06-16 DIAGNOSIS — R10.12 LEFT UPPER QUADRANT PAIN: ICD-10-CM

## 2025-06-16 DIAGNOSIS — R16.1 SPLENOMEGALY: ICD-10-CM

## 2025-06-16 DIAGNOSIS — R16.1 SPLENOMEGALY: Primary | ICD-10-CM

## 2025-06-16 DIAGNOSIS — B25.9 CYTOMEGALOVIRUS INFECTION, UNSPECIFIED CYTOMEGALOVIRAL INFECTION TYPE (HCC): Primary | ICD-10-CM

## 2025-06-16 DIAGNOSIS — K76.0 HEPATIC STEATOSIS: ICD-10-CM

## 2025-06-16 LAB
BASOPHILS # BLD: 0.06 K/UL (ref 0–0.2)
BASOPHILS NFR BLD: 0.6 % (ref 0–1)
EOSINOPHIL # BLD: 0.56 K/UL (ref 0–0.6)
EOSINOPHIL NFR BLD: 5.5 % (ref 0–5)
ERYTHROCYTE [DISTWIDTH] IN BLOOD BY AUTOMATED COUNT: 13.4 % (ref 11.5–14.5)
HAPTOGLOB SERPL-MCNC: 129 MG/DL (ref 30–200)
HCT VFR BLD AUTO: 47 % (ref 42–52)
HGB BLD-MCNC: 15.5 G/DL (ref 14–18)
LYMPHOCYTES # BLD: 2.04 K/UL (ref 1.1–4.5)
LYMPHOCYTES NFR BLD: 20.1 % (ref 20–40)
MCH RBC QN AUTO: 28.5 PG (ref 27–31)
MCHC RBC AUTO-ENTMCNC: 33 G/DL (ref 33–37)
MCV RBC AUTO: 86.6 FL (ref 80–94)
MONOCYTES # BLD: 0.69 K/UL (ref 0–0.9)
MONOCYTES NFR BLD: 6.8 % (ref 1–10)
NEUTROPHILS # BLD: 6.77 K/UL (ref 1.5–7.5)
NEUTS SEG NFR BLD: 66.6 % (ref 50–65)
PLATELET # BLD AUTO: 247 K/UL (ref 130–400)
PMV BLD AUTO: 11.1 FL (ref 9.4–12.4)
RBC # BLD AUTO: 5.43 M/UL (ref 4.7–6.1)
WBC # BLD AUTO: 10.16 K/UL (ref 4.8–10.8)

## 2025-06-16 PROCEDURE — 86038 ANTINUCLEAR ANTIBODIES: CPT

## 2025-06-16 PROCEDURE — 83010 ASSAY OF HAPTOGLOBIN QUANT: CPT

## 2025-06-16 PROCEDURE — 36415 COLL VENOUS BLD VENIPUNCTURE: CPT

## 2025-06-16 PROCEDURE — 99213 OFFICE O/P EST LOW 20 MIN: CPT

## 2025-06-16 PROCEDURE — 86644 CMV ANTIBODY: CPT

## 2025-06-16 PROCEDURE — 86356 MONONUCLEAR CELL ANTIGEN: CPT

## 2025-06-16 PROCEDURE — 99214 OFFICE O/P EST MOD 30 MIN: CPT | Performed by: PHYSICIAN ASSISTANT

## 2025-06-16 PROCEDURE — 85025 COMPLETE CBC W/AUTO DIFF WBC: CPT

## 2025-06-16 ASSESSMENT — ENCOUNTER SYMPTOMS
EYE DISCHARGE: 0
BLOOD IN STOOL: 0
VOICE CHANGE: 0
ABDOMINAL DISTENTION: 0
VOMITING: 0
DIARRHEA: 0
EYE ITCHING: 0
SHORTNESS OF BREATH: 0
BACK PAIN: 0
COUGH: 0
SORE THROAT: 0
PHOTOPHOBIA: 0
CONSTIPATION: 0
COLOR CHANGE: 0
WHEEZING: 0
ABDOMINAL PAIN: 1
NAUSEA: 0
TROUBLE SWALLOWING: 0

## 2025-06-17 LAB
CD59 DEFICIENT GRANULOCYTES NFR BLD: <0.008 % (ref 0–0.01)
CD59 DEFICIENT MONOCYTES NFR BLD: <0.2 % (ref 0–0.2)
CMV DNA SERPL NAA+PROBE-ACNC: NOT DETECTED IU/ML
CMV DNA SERPL NAA+PROBE-LOG IU: NOT DETECTED LOG IU/ML
CMV DNA SERPL QL NAA+PROBE: NOT DETECTED
NUCLEAR IGG SER QL IA: NORMAL
OBSERVATION IMP: NOT DETECTED
RBC CD59 DEFICIENT NFR: <0.008 % (ref 0–0.01)

## 2025-06-20 ENCOUNTER — TELEPHONE (OUTPATIENT)
Dept: HEMATOLOGY | Age: 30
End: 2025-06-20